# Patient Record
Sex: FEMALE | Race: WHITE | Employment: FULL TIME | URBAN - METROPOLITAN AREA
[De-identification: names, ages, dates, MRNs, and addresses within clinical notes are randomized per-mention and may not be internally consistent; named-entity substitution may affect disease eponyms.]

---

## 2017-02-13 PROBLEM — Z37.9 NORMAL LABOR: Status: ACTIVE | Noted: 2017-02-13

## 2019-05-28 NOTE — PROGRESS NOTES
Dulcie Cowden    Age 34 y.o.    female    1985    MRN [id number]    Date___________   Arrival Time_____________  OR Time____________Duration____     Procedure(s):  SUCTION DILATATION AND CURETTAGE    Surgeon  ________________________________  Tene Charlton   General   [place]       Phone [phone] (home)       [address]  Cell Work  ______________________________________________________________________________________________________________________________________________________________________________________________________________________________________________________________________________________________________________________________________________________________    PCP__________________________Phone__________________________________      H&P__________________Bringing      Chart              Epic    DOS           Called_______  EKG__________________Bringing      Chart              Epic    DOS           Called_______  LAB__________________ Bringing      Chart              Epic    DOS           Called_______  CardiacClearance _______Bringing      Chart              Epic    DOS           Called_______      Cardiologist________________________ Phone___________________________      ? Roman [employer] concerns / Waiver on Chart            PAT Communications________________  ? Pre-op Instructions Given [place]          _________________________________  ? Directions to Surgery Center                          _________________________________  ? Transportation Home_______________      _________________________________  ?  Crutches/Walker__________________        _________________________________      ________Pre-op Orders   _______Transcribed    _______Wt.  ________Pharmacy          _______SCD  ______VTE     ______Beta Blocker  ________Consent

## 2019-05-29 ENCOUNTER — ANESTHESIA EVENT (OUTPATIENT)
Dept: OPERATING ROOM | Age: 34
End: 2019-05-29
Payer: COMMERCIAL

## 2019-05-30 ENCOUNTER — ANESTHESIA (OUTPATIENT)
Dept: OPERATING ROOM | Age: 34
End: 2019-05-30
Payer: COMMERCIAL

## 2019-05-30 ENCOUNTER — HOSPITAL ENCOUNTER (OUTPATIENT)
Age: 34
Setting detail: OUTPATIENT SURGERY
Discharge: HOME | End: 2019-05-30
Attending: OBSTETRICS & GYNECOLOGY | Admitting: OBSTETRICS & GYNECOLOGY
Payer: COMMERCIAL

## 2019-05-30 VITALS
HEIGHT: 67 IN | RESPIRATION RATE: 18 BRPM | DIASTOLIC BLOOD PRESSURE: 60 MMHG | TEMPERATURE: 97.5 F | BODY MASS INDEX: 22.6 KG/M2 | HEART RATE: 72 BPM | WEIGHT: 144 LBS | SYSTOLIC BLOOD PRESSURE: 106 MMHG | OXYGEN SATURATION: 100 %

## 2019-05-30 VITALS
SYSTOLIC BLOOD PRESSURE: 94 MMHG | DIASTOLIC BLOOD PRESSURE: 50 MMHG | RESPIRATION RATE: 16 BRPM | OXYGEN SATURATION: 99 %

## 2019-05-30 DIAGNOSIS — O02.1 MISSED ABORTION: ICD-10-CM

## 2019-05-30 LAB
ABO/RH: NORMAL
ANTIBODY SCREEN: NORMAL
HCT VFR BLD CALC: 39.2 % (ref 36–48)
HEMOGLOBIN: 13.5 G/DL (ref 12–16)
MCH RBC QN AUTO: 30.3 PG (ref 26–34)
MCHC RBC AUTO-ENTMCNC: 34.4 G/DL (ref 31–36)
MCV RBC AUTO: 88 FL (ref 80–100)
PDW BLD-RTO: 12.6 % (ref 12.4–15.4)
PLATELET # BLD: 190 K/UL (ref 135–450)
PMV BLD AUTO: 8.6 FL (ref 5–10.5)
RBC # BLD: 4.46 M/UL (ref 4–5.2)
WBC # BLD: 3.4 K/UL (ref 4–11)

## 2019-05-30 PROCEDURE — 7100000001 HC PACU RECOVERY - ADDTL 15 MIN: Performed by: OBSTETRICS & GYNECOLOGY

## 2019-05-30 PROCEDURE — 6360000002 HC RX W HCPCS: Performed by: NURSE ANESTHETIST, CERTIFIED REGISTERED

## 2019-05-30 PROCEDURE — 7100000000 HC PACU RECOVERY - FIRST 15 MIN: Performed by: OBSTETRICS & GYNECOLOGY

## 2019-05-30 PROCEDURE — 2580000003 HC RX 258: Performed by: ANESTHESIOLOGY

## 2019-05-30 PROCEDURE — 86901 BLOOD TYPING SEROLOGIC RH(D): CPT

## 2019-05-30 PROCEDURE — 3600000004 HC SURGERY LEVEL 4 BASE: Performed by: OBSTETRICS & GYNECOLOGY

## 2019-05-30 PROCEDURE — 7100000011 HC PHASE II RECOVERY - ADDTL 15 MIN: Performed by: OBSTETRICS & GYNECOLOGY

## 2019-05-30 PROCEDURE — 2709999900 HC NON-CHARGEABLE SUPPLY: Performed by: OBSTETRICS & GYNECOLOGY

## 2019-05-30 PROCEDURE — 86900 BLOOD TYPING SEROLOGIC ABO: CPT

## 2019-05-30 PROCEDURE — 88305 TISSUE EXAM BY PATHOLOGIST: CPT

## 2019-05-30 PROCEDURE — 3700000000 HC ANESTHESIA ATTENDED CARE: Performed by: OBSTETRICS & GYNECOLOGY

## 2019-05-30 PROCEDURE — 3700000001 HC ADD 15 MINUTES (ANESTHESIA): Performed by: OBSTETRICS & GYNECOLOGY

## 2019-05-30 PROCEDURE — 3600000014 HC SURGERY LEVEL 4 ADDTL 15MIN: Performed by: OBSTETRICS & GYNECOLOGY

## 2019-05-30 PROCEDURE — 2580000003 HC RX 258: Performed by: OBSTETRICS & GYNECOLOGY

## 2019-05-30 PROCEDURE — 7100000010 HC PHASE II RECOVERY - FIRST 15 MIN: Performed by: OBSTETRICS & GYNECOLOGY

## 2019-05-30 PROCEDURE — 86850 RBC ANTIBODY SCREEN: CPT

## 2019-05-30 PROCEDURE — 85027 COMPLETE CBC AUTOMATED: CPT

## 2019-05-30 RX ORDER — MAGNESIUM HYDROXIDE 1200 MG/15ML
LIQUID ORAL CONTINUOUS PRN
Status: COMPLETED | OUTPATIENT
Start: 2019-05-30 | End: 2019-05-30

## 2019-05-30 RX ORDER — FENTANYL CITRATE 50 UG/ML
INJECTION, SOLUTION INTRAMUSCULAR; INTRAVENOUS PRN
Status: DISCONTINUED | OUTPATIENT
Start: 2019-05-30 | End: 2019-05-30 | Stop reason: SDUPTHER

## 2019-05-30 RX ORDER — OXYCODONE HYDROCHLORIDE AND ACETAMINOPHEN 5; 325 MG/1; MG/1
2 TABLET ORAL PRN
Status: DISCONTINUED | OUTPATIENT
Start: 2019-05-30 | End: 2019-05-30 | Stop reason: HOSPADM

## 2019-05-30 RX ORDER — MORPHINE SULFATE 2 MG/ML
2 INJECTION, SOLUTION INTRAMUSCULAR; INTRAVENOUS EVERY 5 MIN PRN
Status: DISCONTINUED | OUTPATIENT
Start: 2019-05-30 | End: 2019-05-30 | Stop reason: HOSPADM

## 2019-05-30 RX ORDER — PROMETHAZINE HYDROCHLORIDE 25 MG/ML
6.25 INJECTION, SOLUTION INTRAMUSCULAR; INTRAVENOUS
Status: DISCONTINUED | OUTPATIENT
Start: 2019-05-30 | End: 2019-05-30 | Stop reason: HOSPADM

## 2019-05-30 RX ORDER — ONDANSETRON 2 MG/ML
4 INJECTION INTRAMUSCULAR; INTRAVENOUS PRN
Status: DISCONTINUED | OUTPATIENT
Start: 2019-05-30 | End: 2019-05-30 | Stop reason: HOSPADM

## 2019-05-30 RX ORDER — DIPHENHYDRAMINE HYDROCHLORIDE 50 MG/ML
12.5 INJECTION INTRAMUSCULAR; INTRAVENOUS
Status: DISCONTINUED | OUTPATIENT
Start: 2019-05-30 | End: 2019-05-30 | Stop reason: HOSPADM

## 2019-05-30 RX ORDER — HYDRALAZINE HYDROCHLORIDE 20 MG/ML
5 INJECTION INTRAMUSCULAR; INTRAVENOUS EVERY 10 MIN PRN
Status: DISCONTINUED | OUTPATIENT
Start: 2019-05-30 | End: 2019-05-30 | Stop reason: HOSPADM

## 2019-05-30 RX ORDER — MORPHINE SULFATE 2 MG/ML
1 INJECTION, SOLUTION INTRAMUSCULAR; INTRAVENOUS EVERY 5 MIN PRN
Status: DISCONTINUED | OUTPATIENT
Start: 2019-05-30 | End: 2019-05-30 | Stop reason: HOSPADM

## 2019-05-30 RX ORDER — OXYCODONE HYDROCHLORIDE AND ACETAMINOPHEN 5; 325 MG/1; MG/1
1 TABLET ORAL PRN
Status: DISCONTINUED | OUTPATIENT
Start: 2019-05-30 | End: 2019-05-30 | Stop reason: HOSPADM

## 2019-05-30 RX ORDER — DEXAMETHASONE SODIUM PHOSPHATE 10 MG/ML
INJECTION INTRAMUSCULAR; INTRAVENOUS PRN
Status: DISCONTINUED | OUTPATIENT
Start: 2019-05-30 | End: 2019-05-30 | Stop reason: SDUPTHER

## 2019-05-30 RX ORDER — LABETALOL HYDROCHLORIDE 5 MG/ML
5 INJECTION, SOLUTION INTRAVENOUS EVERY 10 MIN PRN
Status: DISCONTINUED | OUTPATIENT
Start: 2019-05-30 | End: 2019-05-30 | Stop reason: HOSPADM

## 2019-05-30 RX ORDER — ONDANSETRON 2 MG/ML
INJECTION INTRAMUSCULAR; INTRAVENOUS PRN
Status: DISCONTINUED | OUTPATIENT
Start: 2019-05-30 | End: 2019-05-30 | Stop reason: SDUPTHER

## 2019-05-30 RX ORDER — MIDAZOLAM HYDROCHLORIDE 1 MG/ML
INJECTION INTRAMUSCULAR; INTRAVENOUS PRN
Status: DISCONTINUED | OUTPATIENT
Start: 2019-05-30 | End: 2019-05-30 | Stop reason: SDUPTHER

## 2019-05-30 RX ORDER — SODIUM CHLORIDE 0.9 % (FLUSH) 0.9 %
10 SYRINGE (ML) INJECTION EVERY 12 HOURS SCHEDULED
Status: DISCONTINUED | OUTPATIENT
Start: 2019-05-30 | End: 2019-05-30 | Stop reason: HOSPADM

## 2019-05-30 RX ORDER — PROPOFOL 10 MG/ML
INJECTION, EMULSION INTRAVENOUS PRN
Status: DISCONTINUED | OUTPATIENT
Start: 2019-05-30 | End: 2019-05-30 | Stop reason: SDUPTHER

## 2019-05-30 RX ORDER — LIDOCAINE HYDROCHLORIDE 10 MG/ML
1 INJECTION, SOLUTION EPIDURAL; INFILTRATION; INTRACAUDAL; PERINEURAL
Status: DISCONTINUED | OUTPATIENT
Start: 2019-05-30 | End: 2019-05-30 | Stop reason: HOSPADM

## 2019-05-30 RX ORDER — SODIUM CHLORIDE, SODIUM LACTATE, POTASSIUM CHLORIDE, CALCIUM CHLORIDE 600; 310; 30; 20 MG/100ML; MG/100ML; MG/100ML; MG/100ML
INJECTION, SOLUTION INTRAVENOUS CONTINUOUS
Status: DISCONTINUED | OUTPATIENT
Start: 2019-05-30 | End: 2019-05-30 | Stop reason: HOSPADM

## 2019-05-30 RX ORDER — MEPERIDINE HYDROCHLORIDE 50 MG/ML
12.5 INJECTION INTRAMUSCULAR; INTRAVENOUS; SUBCUTANEOUS EVERY 5 MIN PRN
Status: DISCONTINUED | OUTPATIENT
Start: 2019-05-30 | End: 2019-05-30 | Stop reason: HOSPADM

## 2019-05-30 RX ORDER — SODIUM CHLORIDE 0.9 % (FLUSH) 0.9 %
10 SYRINGE (ML) INJECTION PRN
Status: DISCONTINUED | OUTPATIENT
Start: 2019-05-30 | End: 2019-05-30 | Stop reason: HOSPADM

## 2019-05-30 RX ADMIN — MIDAZOLAM HYDROCHLORIDE 2 MG: 2 INJECTION, SOLUTION INTRAMUSCULAR; INTRAVENOUS at 10:15

## 2019-05-30 RX ADMIN — FENTANYL CITRATE 50 MCG: 50 INJECTION INTRAMUSCULAR; INTRAVENOUS at 10:15

## 2019-05-30 RX ADMIN — SODIUM CHLORIDE, POTASSIUM CHLORIDE, SODIUM LACTATE AND CALCIUM CHLORIDE: 600; 310; 30; 20 INJECTION, SOLUTION INTRAVENOUS at 08:41

## 2019-05-30 RX ADMIN — SODIUM CHLORIDE, POTASSIUM CHLORIDE, SODIUM LACTATE AND CALCIUM CHLORIDE: 600; 310; 30; 20 INJECTION, SOLUTION INTRAVENOUS at 10:12

## 2019-05-30 RX ADMIN — ONDANSETRON 4 MG: 2 INJECTION INTRAMUSCULAR; INTRAVENOUS at 10:27

## 2019-05-30 RX ADMIN — DEXAMETHASONE SODIUM PHOSPHATE 5 MG: 10 INJECTION INTRAMUSCULAR; INTRAVENOUS at 10:27

## 2019-05-30 RX ADMIN — PROPOFOL 200 MG: 10 INJECTION, EMULSION INTRAVENOUS at 10:15

## 2019-05-30 ASSESSMENT — PULMONARY FUNCTION TESTS
PIF_VALUE: 1
PIF_VALUE: 11
PIF_VALUE: 0
PIF_VALUE: 1
PIF_VALUE: 2
PIF_VALUE: 9
PIF_VALUE: 2
PIF_VALUE: 17
PIF_VALUE: 2
PIF_VALUE: 2
PIF_VALUE: 12
PIF_VALUE: 2
PIF_VALUE: 17
PIF_VALUE: 1
PIF_VALUE: 2
PIF_VALUE: 0
PIF_VALUE: 2
PIF_VALUE: 1
PIF_VALUE: 9
PIF_VALUE: 2
PIF_VALUE: 10
PIF_VALUE: 21
PIF_VALUE: 9
PIF_VALUE: 1
PIF_VALUE: 2

## 2019-05-30 ASSESSMENT — PAIN SCALES - GENERAL
PAINLEVEL_OUTOF10: 0

## 2019-05-30 ASSESSMENT — PAIN - FUNCTIONAL ASSESSMENT: PAIN_FUNCTIONAL_ASSESSMENT: 0-10

## 2019-05-30 NOTE — ANESTHESIA POSTPROCEDURE EVALUATION
Department of Anesthesiology  Postprocedure Note    Patient: Daisy Bolaños  MRN: [id number]  YOB: 1985  Date of evaluation: 2019  Time:  12:13 PM     Procedure Summary     Date:  19 Room / Location:  [facility] OR  [facility] OR    Anesthesia Start:  8681 Anesthesia Stop:  4882    Procedure:  SUCTION DILATATION AND CURETTAGE (N/A Vagina ) Diagnosis:       Missed       (Missed  [O02.1])    Surgeon:  Maribeth Mcgregor MD Responsible Provider:  Torito Ibarra MD    Anesthesia Type:  general ASA Status:  1          Anesthesia Type: general    Ranjana Phase I: Ranjana Score: 10    Ranjana Phase II: Ranjana Score: 10    Last vitals: Reviewed and per EMR flowsheets.        Anesthesia Post Evaluation    Patient location during evaluation: PACU  Patient participation: complete - patient participated  Level of consciousness: awake and alert  Pain score: 0  Airway patency: patent  Nausea & Vomiting: no nausea and no vomiting  Complications: no  Cardiovascular status: blood pressure returned to baseline  Respiratory status: acceptable  Hydration status: stable

## 2019-05-30 NOTE — BRIEF OP NOTE
Brief Postoperative Note  ______________________________________________________________    Patient: Madina Mclain  YOB: 1985  MRN: [id number]  Date of Procedure: 2019    Pre-Op Diagnosis: Missed  [O02.1]    Post-Op Diagnosis: Same       Procedure(s):  SUCTION DILATATION AND CURETTAGE    Anesthesia: General    Surgeon(s):  Melanie Faye MD      Estimated Blood Loss (mL): less than 50     Complications: None    Specimens:   ID Type Source Tests Collected by Time Destination   A : products of conception and endometrial curettings  Tissue Tissue SURGICAL PATHOLOGY Melanie Faye MD 2019 1029        Implants:  * No implants in log *      Drains: * No LDAs found *    Findings: Uterus sounded to 9 cm    Melanie Faye MD  Date: 2019  Time: 10:34 AM

## 2019-05-30 NOTE — ANESTHESIA PRE PROCEDURE
Department of Anesthesiology  Preprocedure Note       Name:  Titus Mejias   Age:  34 y.o.  :  1985                                          MRN:  [id number]         Date:  2019      Surgeon: Johann Meléndez):  Courtney Covington MD    Procedure: SUCTION DILATATION AND CURETTAGE (N/A Vagina )    Medications prior to admission:   Prior to Admission medications    Medication Sig Start Date End Date Taking? Authorizing Provider   Prenatal MV-Min-Fe Fum-FA-DHA (PRENATAL 1 PO) Take by mouth    Historical Provider, MD       Current medications:    Current Facility-Administered Medications   Medication Dose Route Frequency Provider Last Rate Last Dose    lactated ringers infusion   Intravenous Continuous Sherrie Raymond MD        sodium chloride flush 0.9 % injection 10 mL  10 mL Intravenous 2 times per day Sherrie Raymond MD        sodium chloride flush 0.9 % injection 10 mL  10 mL Intravenous PRN Sherrie Raymond MD        lidocaine PF 1 % injection 1 mL  1 mL Intradermal Once PRN Sherrie Raymond MD           Allergies: Allergies   Allergen Reactions    Amoxicillin Hives and Rash       Problem List:    Patient Active Problem List   Diagnosis Code    Dizziness R42    Normal labor O80, Z37.9       Past Medical History:        Diagnosis Date    Cancer ([facility] [address]     synovial cell sarcoma of right foot. 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 Social History:    Social History     Tobacco Use    Smoking status: Never Smoker    Smokeless tobacco: Never Used   Substance Use Topics    Alcohol use:  No                                Counseling given: Not Answered      Vital Signs (Current):   Vitals:    05/28/19 1457 05/30/19 0820   BP:  108/70   Pulse:  71   Resp:  16   Temp:  97.7 °F (36.5 °C)   TempSrc:  Oral   SpO2:  100%   Weight: 144 lb (65.3 kg)    Height: 5' 7\" (1.702 m)                                               BP Readings from Last 3 Encounters:   05/30/19 108/70   02/15/17 115/65   03/16/15 100/78       NPO Status: Time of last liquid consumption: 2100                        Time of last solid consumption: 2200                        Date of last liquid consumption: 05/29/19                        Date of last solid food consumption: 05/29/19    BMI:   Wt Readings from Last 3 Encounters:   05/28/19 144 lb (65.3 kg)   02/13/17 170 lb (77.1 kg)   03/16/15 138 lb 9.6 oz (62.9 kg)     Body mass index is 22.55 kg/m². CBC:   Lab Results   Component Value Date    WBC 17.8 02/14/2017    RBC 4.16 02/14/2017    HGB 12.0 02/14/2017    HCT 36.6 02/14/2017    MCV 88.1 02/14/2017    RDW 13.3 02/14/2017     02/14/2017       CMP:   Lab Results   Component Value Date     03/16/2015    K 4.5 03/16/2015     03/16/2015    CO2 28 03/16/2015    BUN 12 03/16/2015    CREATININE 0.6 03/16/2015    GFRAA >60 03/16/2015    LABGLOM >60 03/16/2015    GLUCOSE 79 03/16/2015    PROT 7.0 03/16/2015    CALCIUM 9.5 03/16/2015    BILITOT 0.4 03/16/2015    ALKPHOS 65 03/16/2015    AST 40 03/16/2015    ALT 30 03/16/2015       POC Tests: No results for input(s): POCGLU, POCNA, POCK, POCCL, POCBUN, POCHEMO, POCHCT in the last 72 hours.     Coags: No results found for: PROTIME, INR, APTT    HCG (If Applicable):   Lab Results   Component Value Date    PREGTESTUR neg 03/16/2015        ABGs: No results found for: PHART, PO2ART, NDQ1RPO, NAZ0WVU, BEART, V2JJVYQA     Type & Screen (If Applicable):  No results found for: LABABO, [address]    Anesthesia Evaluation  Patient summary reviewed and Nursing notes reviewed  Airway: Mallampati: I  TM distance: >3 FB   Neck ROM: full  Mouth opening: > = 3 FB Dental: normal exam         Pulmonary:Negative Pulmonary ROS and normal exam  breath sounds clear to auscultation                             Cardiovascular:Negative CV ROS            Rhythm: regular  Rate: normal                    Neuro/Psych:   Negative Neuro/Psych ROS              GI/Hepatic/Renal: Neg GI/Hepatic/Renal ROS            Endo/Other: Negative Endo/Other ROS                    Abdominal:           Vascular: negative vascular ROS. Anesthesia Plan      general     ASA 1       Induction: intravenous. MIPS: Postoperative opioids intended and Prophylactic antiemetics administered. Anesthetic plan and risks discussed with patient. Plan discussed with CRNA.                   Harrison Larson MD   5/30/2019

## 2019-05-30 NOTE — OP NOTE
[address] [address]                                OPERATIVE REPORT    PATIENT NAME: Qing Michelle                :        1985  MED REC NO:   [id number]                          ROOM:  ACCOUNT NO:   [de-identified]                           ADMIT DATE: 2019  PROVIDER:     Kimberly Mckeon MD    DATE OF PROCEDURE:  2019    PREOPERATIVE DIAGNOSIS:  Missed AB. POSTOPERATIVE DIAGNOSIS:  Missed AB. OPERATION PERFORMED:  Suction dilation and curettage. ANESTHESIA:  General.    SURGEON:  EMELY New MD    ESTIMATED BLOOD LOSS:  Less than 50 mL. COMPLICATIONS:  None. SPECIMENS:  Products of conception. FINDINGS:  Uterus sounded to 9 cm. HISTORY OF PRESENT ILLNESS:  The patient is a 37-year-old female that  had an ultrasound that showed an irregular gestational sac; however,  fetal cardiac activity was noted. A repeat ultrasound was performed and  no fetal cardiac activity was noted. The risks and benefits and  alternatives of proceeding with suction dilation and curettage were  reviewed with the patient and partner. She desired to proceed with  suction D and C. She presented to the OR. She had IV fluids running. OPERATIVE PROCEDURE:  She was taken to the operating room and general  anesthesia was performed. Her legs were placed in candy cane stirrups. She was prepped and draped in the normal sterile fashion. A red-rubber  catheter was used to drain her bladder. A time-out was performed and  then weighted speculum was placed in the posterior aspect of the vagina. The Au retractor was placed anteriorly. The single-tooth tenaculum  was placed on the anterior aspect of the cervix. The weighted speculum  was removed posteriorly and Au retractor was placed there. The  anterior Au retractor had been removed. The cervix was already  dilated.   It was continued to be dilated to accept the  uterine sound. The uterus sounded to 9 cm. The cervix was continued to  be dilated until to receive #8 curved suction curette. It was gently  introduced into the endometrial cavity. The products of conceptions  were removed. Sharp gentle curettage was performed until a gritty  texture was noted. The suction curette was reintroduced to remove the  remaining products of conception. Bleeding was minimal.  The  single-tooth tenaculum was removed from the anterior aspect of the  cervix. The tenac site was noted to be hemostatic. The Au retractor  was removed posteriorly. The patient was taken to Recovery in stable  condition.         Kt Caal MD    D: 05/30/2019 10:40:17       T: 05/30/2019 15:42:37     [email or web address]  Job#: [id number]     Doc#: [id number]    CC:

## 2020-01-07 LAB
ABO, EXTERNAL RESULT: NORMAL
HEP B, EXTERNAL RESULT: NEGATIVE
HIV, EXTERNAL RESULT: NORMAL
RH FACTOR, EXTERNAL RESULT: POSITIVE
RPR, EXTERNAL RESULT: NONREACTIVE
RUBELLA TITER, EXTERNAL RESULT: NORMAL

## 2020-06-30 LAB — GBS, EXTERNAL RESULT: NEGATIVE

## 2020-07-13 ENCOUNTER — OFFICE VISIT (OUTPATIENT)
Dept: PRIMARY CARE CLINIC | Age: 35
End: 2020-07-13
Payer: COMMERCIAL

## 2020-07-13 PROCEDURE — 99211 OFF/OP EST MAY X REQ PHY/QHP: CPT | Performed by: NURSE PRACTITIONER

## 2020-07-13 NOTE — PROGRESS NOTES
Anna Izzy received a viral test for COVID-19. They were educated on isolation and quarantine as appropriate. For any symptoms, they were directed to seek care from their PCP, given contact information to establish with a doctor, directed to an urgent care or the emergency room.

## 2020-07-15 ENCOUNTER — HOSPITAL ENCOUNTER (INPATIENT)
Age: 35
LOS: 2 days | Discharge: HOME | End: 2020-07-17
Attending: OBSTETRICS & GYNECOLOGY | Admitting: OBSTETRICS & GYNECOLOGY
Payer: COMMERCIAL

## 2020-07-15 ENCOUNTER — ANESTHESIA (OUTPATIENT)
Dept: LABOR AND DELIVERY | Age: 35
End: 2020-07-15
Payer: COMMERCIAL

## 2020-07-15 ENCOUNTER — ANESTHESIA EVENT (OUTPATIENT)
Dept: LABOR AND DELIVERY | Age: 35
End: 2020-07-15
Payer: COMMERCIAL

## 2020-07-15 PROBLEM — Z3A.38 38 WEEKS GESTATION OF PREGNANCY: Status: ACTIVE | Noted: 2020-07-15

## 2020-07-15 PROBLEM — Z3A.39 39 WEEKS GESTATION OF PREGNANCY: Status: ACTIVE | Noted: 2020-07-15

## 2020-07-15 LAB
BASOPHILS ABSOLUTE: 0 K/UL (ref 0–0.2)
BASOPHILS RELATIVE PERCENT: 0.4 %
EOSINOPHILS ABSOLUTE: 0 K/UL (ref 0–0.6)
EOSINOPHILS RELATIVE PERCENT: 0 %
HCT VFR BLD CALC: 37.3 % (ref 36–48)
HEMOGLOBIN: 12.6 G/DL (ref 12–16)
LYMPHOCYTES ABSOLUTE: 1.6 K/UL (ref 1–5.1)
LYMPHOCYTES RELATIVE PERCENT: 17.1 %
MCH RBC QN AUTO: 30.5 PG (ref 26–34)
MCHC RBC AUTO-ENTMCNC: 33.8 G/DL (ref 31–36)
MCV RBC AUTO: 90.1 FL (ref 80–100)
MONOCYTES ABSOLUTE: 0.6 K/UL (ref 0–1.3)
MONOCYTES RELATIVE PERCENT: 6 %
NEUTROPHILS ABSOLUTE: 7.3 K/UL (ref 1.7–7.7)
NEUTROPHILS RELATIVE PERCENT: 76.5 %
PDW BLD-RTO: 12.8 % (ref 12.4–15.4)
PLATELET # BLD: 216 K/UL (ref 135–450)
PMV BLD AUTO: 8.7 FL (ref 5–10.5)
RBC # BLD: 4.14 M/UL (ref 4–5.2)
SARS-COV-2, NAAT: NOT DETECTED
SPECIMEN STATUS: NORMAL
WBC # BLD: 9.5 K/UL (ref 4–11)

## 2020-07-15 PROCEDURE — 86780 TREPONEMA PALLIDUM: CPT

## 2020-07-15 PROCEDURE — 85025 COMPLETE CBC W/AUTO DIFF WBC: CPT

## 2020-07-15 PROCEDURE — 7200000001 HC VAGINAL DELIVERY

## 2020-07-15 PROCEDURE — 87591 N.GONORRHOEAE DNA AMP PROB: CPT

## 2020-07-15 PROCEDURE — 10907ZC DRAINAGE OF AMNIOTIC FLUID, THERAPEUTIC FROM PRODUCTS OF CONCEPTION, VIA NATURAL OR ARTIFICIAL OPENING: ICD-10-PCS | Performed by: OBSTETRICS & GYNECOLOGY

## 2020-07-15 PROCEDURE — 2500000003 HC RX 250 WO HCPCS: Performed by: NURSE ANESTHETIST, CERTIFIED REGISTERED

## 2020-07-15 PROCEDURE — 2580000003 HC RX 258: Performed by: OBSTETRICS & GYNECOLOGY

## 2020-07-15 PROCEDURE — 0HQ9XZZ REPAIR PERINEUM SKIN, EXTERNAL APPROACH: ICD-10-PCS | Performed by: OBSTETRICS & GYNECOLOGY

## 2020-07-15 PROCEDURE — 87491 CHLMYD TRACH DNA AMP PROBE: CPT

## 2020-07-15 PROCEDURE — 51701 INSERT BLADDER CATHETER: CPT

## 2020-07-15 PROCEDURE — 3700000025 EPIDURAL BLOCK: Performed by: ANESTHESIOLOGY

## 2020-07-15 PROCEDURE — 1220000000 HC SEMI PRIVATE OB R&B

## 2020-07-15 PROCEDURE — 80307 DRUG TEST PRSMV CHEM ANLYZR: CPT

## 2020-07-15 RX ORDER — SODIUM CHLORIDE, SODIUM LACTATE, POTASSIUM CHLORIDE, CALCIUM CHLORIDE 600; 310; 30; 20 MG/100ML; MG/100ML; MG/100ML; MG/100ML
INJECTION, SOLUTION INTRAVENOUS CONTINUOUS
Status: DISCONTINUED | OUTPATIENT
Start: 2020-07-15 | End: 2020-07-15

## 2020-07-15 RX ORDER — DOCUSATE SODIUM 100 MG/1
100 CAPSULE, LIQUID FILLED ORAL 2 TIMES DAILY
Status: DISCONTINUED | OUTPATIENT
Start: 2020-07-15 | End: 2020-07-17 | Stop reason: HOSPADM

## 2020-07-15 RX ORDER — SODIUM CHLORIDE 0.9 % (FLUSH) 0.9 %
10 SYRINGE (ML) INJECTION EVERY 12 HOURS SCHEDULED
Status: DISCONTINUED | OUTPATIENT
Start: 2020-07-15 | End: 2020-07-17 | Stop reason: HOSPADM

## 2020-07-15 RX ORDER — BUTORPHANOL TARTRATE 1 MG/ML
1 INJECTION, SOLUTION INTRAMUSCULAR; INTRAVENOUS
Status: DISCONTINUED | OUTPATIENT
Start: 2020-07-15 | End: 2020-07-15

## 2020-07-15 RX ORDER — KETOROLAC TROMETHAMINE 30 MG/ML
30 INJECTION, SOLUTION INTRAMUSCULAR; INTRAVENOUS EVERY 6 HOURS
Status: ACTIVE | OUTPATIENT
Start: 2020-07-15 | End: 2020-07-16

## 2020-07-15 RX ORDER — ACETAMINOPHEN 325 MG/1
650 TABLET ORAL EVERY 4 HOURS PRN
Status: DISCONTINUED | OUTPATIENT
Start: 2020-07-15 | End: 2020-07-17 | Stop reason: HOSPADM

## 2020-07-15 RX ORDER — SODIUM CHLORIDE 0.9 % (FLUSH) 0.9 %
10 SYRINGE (ML) INJECTION PRN
Status: DISCONTINUED | OUTPATIENT
Start: 2020-07-15 | End: 2020-07-17 | Stop reason: HOSPADM

## 2020-07-15 RX ORDER — SODIUM CHLORIDE 0.9 % (FLUSH) 0.9 %
10 SYRINGE (ML) INJECTION EVERY 12 HOURS SCHEDULED
Status: DISCONTINUED | OUTPATIENT
Start: 2020-07-15 | End: 2020-07-15

## 2020-07-15 RX ORDER — SODIUM CHLORIDE 0.9 % (FLUSH) 0.9 %
10 SYRINGE (ML) INJECTION PRN
Status: DISCONTINUED | OUTPATIENT
Start: 2020-07-15 | End: 2020-07-15

## 2020-07-15 RX ORDER — LANOLIN 100 %
OINTMENT (GRAM) TOPICAL PRN
Status: DISCONTINUED | OUTPATIENT
Start: 2020-07-15 | End: 2020-07-17 | Stop reason: HOSPADM

## 2020-07-15 RX ORDER — BUPIVACAINE HYDROCHLORIDE 5 MG/ML
INJECTION, SOLUTION EPIDURAL; INTRACAUDAL PRN
Status: DISCONTINUED | OUTPATIENT
Start: 2020-07-15 | End: 2020-07-15 | Stop reason: SDUPTHER

## 2020-07-15 RX ORDER — BUPIVACAINE HYDROCHLORIDE 2.5 MG/ML
INJECTION, SOLUTION EPIDURAL; INFILTRATION; INTRACAUDAL
Status: DISCONTINUED
Start: 2020-07-15 | End: 2020-07-15

## 2020-07-15 RX ORDER — DIPHENHYDRAMINE HYDROCHLORIDE 50 MG/ML
12.5 INJECTION INTRAMUSCULAR; INTRAVENOUS EVERY 6 HOURS PRN
Status: DISCONTINUED | OUTPATIENT
Start: 2020-07-15 | End: 2020-07-17 | Stop reason: HOSPADM

## 2020-07-15 RX ORDER — METHYLERGONOVINE MALEATE 0.2 MG/ML
200 INJECTION INTRAVENOUS PRN
Status: DISCONTINUED | OUTPATIENT
Start: 2020-07-15 | End: 2020-07-17 | Stop reason: HOSPADM

## 2020-07-15 RX ORDER — DIPHENHYDRAMINE HCL 25 MG
25 TABLET ORAL EVERY 4 HOURS PRN
Status: DISCONTINUED | OUTPATIENT
Start: 2020-07-15 | End: 2020-07-15

## 2020-07-15 RX ORDER — ACETAMINOPHEN 325 MG/1
650 TABLET ORAL EVERY 4 HOURS PRN
Status: DISCONTINUED | OUTPATIENT
Start: 2020-07-15 | End: 2020-07-15

## 2020-07-15 RX ORDER — SODIUM CHLORIDE, SODIUM LACTATE, POTASSIUM CHLORIDE, CALCIUM CHLORIDE 600; 310; 30; 20 MG/100ML; MG/100ML; MG/100ML; MG/100ML
INJECTION, SOLUTION INTRAVENOUS CONTINUOUS
Status: DISCONTINUED | OUTPATIENT
Start: 2020-07-15 | End: 2020-07-17 | Stop reason: HOSPADM

## 2020-07-15 RX ORDER — IBUPROFEN 600 MG/1
600 TABLET ORAL EVERY 6 HOURS PRN
Status: DISCONTINUED | OUTPATIENT
Start: 2020-07-16 | End: 2020-07-17 | Stop reason: HOSPADM

## 2020-07-15 RX ORDER — ONDANSETRON 2 MG/ML
4 INJECTION INTRAMUSCULAR; INTRAVENOUS EVERY 6 HOURS PRN
Status: DISCONTINUED | OUTPATIENT
Start: 2020-07-15 | End: 2020-07-15

## 2020-07-15 RX ORDER — AMMONIA INHALANTS 0.04 G/.3ML
INHALANT RESPIRATORY (INHALATION)
Status: DISCONTINUED
Start: 2020-07-15 | End: 2020-07-15

## 2020-07-15 RX ADMIN — SODIUM CHLORIDE, POTASSIUM CHLORIDE, SODIUM LACTATE AND CALCIUM CHLORIDE: 600; 310; 30; 20 INJECTION, SOLUTION INTRAVENOUS at 19:19

## 2020-07-15 RX ADMIN — Medication 15 ML/HR: at 19:07

## 2020-07-15 RX ADMIN — SODIUM CHLORIDE, POTASSIUM CHLORIDE, SODIUM LACTATE AND CALCIUM CHLORIDE: 600; 310; 30; 20 INJECTION, SOLUTION INTRAVENOUS at 18:43

## 2020-07-15 RX ADMIN — BUPIVACAINE HYDROCHLORIDE 7 ML: 5 INJECTION, SOLUTION EPIDURAL; INTRACAUDAL; PERINEURAL at 19:35

## 2020-07-15 RX ADMIN — SODIUM CHLORIDE, POTASSIUM CHLORIDE, SODIUM LACTATE AND CALCIUM CHLORIDE: 600; 310; 30; 20 INJECTION, SOLUTION INTRAVENOUS at 18:00

## 2020-07-15 RX ADMIN — BUPIVACAINE HYDROCHLORIDE 0.8 ML: 5 INJECTION, SOLUTION EPIDURAL; INTRACAUDAL; PERINEURAL at 18:58

## 2020-07-15 NOTE — H&P
Department of Obstetrics and Gynecology   Obstetrics History and Physical        CHIEF COMPLAINT:  contractions    HISTORY OF PRESENT ILLNESS:      The patient is a 30 y.o. female at 36w4d. OB History        3    Para   1    Term   1       0    AB   1    Living   1       SAB   1    TAB   0    Ectopic   0    Molar        Multiple   0    Live Births   1            Patient presents with a chief complaint as above and is being admitted for active phase labor    Estimated Due Date: Estimated Date of Delivery: 20    PRENATAL CARE:    Complicated by: none    PAST OB HISTORY:  OB History        3    Para   1    Term   1       0    AB   1    Living   1       SAB   1    TAB   0    Ectopic   0    Molar        Multiple   0    Live Births   1                Past Medical History:        Diagnosis Date    Cancer ([facility] [address]     synovial cell sarcoma of right foot.  Infertility, female     This is clomid pregnancy, also used progesterone, pregnant on  round    Synovial sarcoma, epithelioid cell ([facility] [address]     foot     Past Surgical History:        Procedure Laterality Date    DILATION AND CURETTAGE OF UTERUS N/A 2019    SUCTION DILATATION AND CURETTAGE performed by Tiago Mcdonald MD at [address] [place] [address]      patient had synovial sarcoma, had foot surgery and lung biopsy, lungs negative, foot + for cancer, had radiation.     PRE-MALIGNANT / BENIGN SKIN LESION EXCISION      WISDOM TOOTH EXTRACTION       Allergies:  Amoxicillin    Social History:    Social History     Socioeconomic History    Marital status:      Spouse name: Not on file    Number of children: Not on file    Years of education: Not on file    Highest education level: Not on file   Occupational History    Not on file   Social Needs    Financial resource strain: Not on file    Food insecurity     Worry: Not on file     Inability: Not on file    Transportation needs     Medical: Not on file Non-medical: Not on file   Tobacco Use    Smoking status: Never Smoker    Smokeless tobacco: Never Used   Substance and Sexual Activity    Alcohol use: No    Drug use: No    Sexual activity: Yes     Partners: Male   Lifestyle    Physical activity     Days per week: Not on file     Minutes per session: Not on file    Stress: Not on file   Relationships    Social connections     Talks on phone: Not on file     Gets together: Not on file     Attends Shinto service: Not on file     Active member of club or organization: Not on file     Attends meetings of clubs or organizations: Not on file     Relationship status: Not on file    Intimate partner violence     Fear of current or ex partner: Not on file     Emotionally abused: Not on file     Physically abused: Not on file     Forced sexual activity: Not on file   Other Topics Concern    Not on file   Social History Narrative    Not on file     Family History:       Problem Relation Age of Onset    High Blood Pressure Mother     Cancer Paternal Aunt         ovarian    Heart Disease Paternal Aunt         MI    High Blood Pressure Maternal Grandmother     Heart Disease Maternal Grandmother         CHF    Stroke Maternal Grandmother         PE    High Blood Pressure Maternal Grandfather     Stroke Maternal Grandfather     Cancer Maternal Grandfather         prostate, kidney    Other Maternal Grandfather         abdominal aneurysm    Heart Disease Maternal Grandfather         chf    Kidney Disease Maternal Grandfather     Cancer Paternal Grandmother         ovarian    High Blood Pressure Paternal Grandmother     Heart Disease Paternal Grandmother         MI    Diabetes Paternal Grandmother     High Blood Pressure Maternal Aunt     High Blood Pressure Maternal Uncle      Medications Prior to Admission:  Medications Prior to Admission: Prenatal MV-Min-Fe Fum-FA-DHA (PRENATAL 1 PO), Take by mouth    REVIEW OF SYSTEMS:    wnl    PHYSICAL

## 2020-07-15 NOTE — ANESTHESIA PRE PROCEDURE
Department of Anesthesiology  Preprocedure Note       Name:  Montserrat Chong   Age:  30 y.o.  :  1985                                          MRN:  [id number]         Date:  7/15/2020      Surgeon: * No surgeons listed *    Procedure: * No procedures listed *    Medications prior to admission:   Prior to Admission medications    Medication Sig Start Date End Date Taking? Authorizing Provider   Prenatal MV-Min-Fe Fum-FA-DHA (PRENATAL 1 PO) Take by mouth    Historical Provider, MD       Current medications:    Current Facility-Administered Medications   Medication Dose Route Frequency Provider Last Rate Last Dose    lactated ringers infusion   Intravenous Continuous Boris Cooks,  mL/hr at 07/15/20 1843      sodium chloride flush 0.9 % injection 10 mL  10 mL Intravenous 2 times per day Boris Cooks, MD        sodium chloride flush 0.9 % injection 10 mL  10 mL Intravenous PRN Boris Cooks, MD        acetaminophen (TYLENOL) tablet 650 mg  650 mg Oral Q4H PRN Boris Cooks, MD        diphenhydrAMINE (BENADRYL) tablet 25 mg  25 mg Oral Q4H PRN Boris Cooks, MD        ondansetron [facility]) injection 4 mg  4 mg Intravenous Q6H PRN Boris Cooks, MD        oxytocin (PITOCIN) 30 units in 500 mL infusion  1 cristi-units/min Intravenous Continuous PRN Boris Cooks, MD        butorphanol (STADOL) injection 1 mg  1 mg Intravenous Q3H PRN Boris Cooks, MD        bupivacaine (PF) (MARCAINE) 0.25 % injection             sodium chloride 0.9 % 200 mL with fentaNYL 500 mcg, bupivacaine 0.5% 50 mL (OB) epidural                Allergies:     Allergies   Allergen Reactions    Amoxicillin Hives and Rash       Problem List:    Patient Active Problem List   Diagnosis Code    Dizziness R42    Normal labor O80, Z37.9    Missed ab O02.1    38 weeks gestation of pregnancy Z3A.38       Past Medical History:        Diagnosis Date    Cancer ([address]     synovial

## 2020-07-15 NOTE — PROGRESS NOTES
Call to EMELY Umaña CRNA to inform him patient was still having pain on her right side.  He stated he would be in to check on her

## 2020-07-15 NOTE — ANESTHESIA PROCEDURE NOTES
Epidural Block    Patient location during procedure: OB  Start time: 7/15/2020 6:52 PM  End time: 7/15/2020 7:05 PM  Reason for block: labor epidural  Staffing  Anesthesiologist: Ruba Loera MD  Resident/CRNA: KOLBY Darling - CRNA  Performed: resident/CRNA   Preanesthetic Checklist  Completed: patient identified, pre-op evaluation, timeout performed, IV checked, risks and benefits discussed, monitors and equipment checked, anesthesia consent given, oxygen available and patient being monitored  Epidural  Patient position: sitting  Prep: ChloraPrep and site prepped and draped  Patient monitoring: cardiac monitor, continuous pulse ox and frequent blood pressure checks  Approach: midline  Location: lumbar (1-5)  Injection technique: KARSTEN air  Provider prep: mask and sterile gloves  Needle  Needle type: Tuohy   Needle gauge: 17 G  Needle length: 3.5 in  Needle insertion depth: 4 cm  Catheter type: side hole  Catheter size: 19 G (20 G)  Catheter at skin depth: 10 cm  Test dose: negative (3ml 1.5% lido with epi)  Assessment  Sensory level: T8  Hemodynamics: stable  Attempts: 1  Additional Notes  25 gauge pencil point spinal needle inserted through tuohy. Positive CSF. 0.8ml 0.5% bupivicaine injected.

## 2020-07-15 NOTE — PROGRESS NOTES
Call from Dr. Paolo Quinteros. I informed him patient received epidural and I asked if he wanted me to go ahead and check her. He said he would be over to check her and break her water.

## 2020-07-16 LAB
AMPHETAMINE SCREEN, URINE: NORMAL
BARBITURATE SCREEN URINE: NORMAL
BENZODIAZEPINE SCREEN, URINE: NORMAL
BUPRENORPHINE URINE: NORMAL
CANNABINOID SCREEN URINE: NORMAL
COCAINE METABOLITE SCREEN URINE: NORMAL
Lab: NORMAL
METHADONE SCREEN, URINE: NORMAL
OPIATE SCREEN URINE: NORMAL
OXYCODONE URINE: NORMAL
PH UA: 6
PHENCYCLIDINE SCREEN URINE: NORMAL
PROPOXYPHENE SCREEN: NORMAL
SARS-COV-2: NOT DETECTED
SOURCE: NORMAL
TOTAL SYPHILLIS IGG/IGM: NORMAL

## 2020-07-16 PROCEDURE — 2580000003 HC RX 258: Performed by: OBSTETRICS & GYNECOLOGY

## 2020-07-16 PROCEDURE — 7200000001 HC VAGINAL DELIVERY

## 2020-07-16 PROCEDURE — 51701 INSERT BLADDER CATHETER: CPT

## 2020-07-16 PROCEDURE — 6370000000 HC RX 637 (ALT 250 FOR IP): Performed by: OBSTETRICS & GYNECOLOGY

## 2020-07-16 PROCEDURE — 1220000000 HC SEMI PRIVATE OB R&B

## 2020-07-16 RX ADMIN — DOCUSATE SODIUM 100 MG: 100 CAPSULE, LIQUID FILLED ORAL at 21:03

## 2020-07-16 RX ADMIN — Medication 10 ML: at 21:03

## 2020-07-16 RX ADMIN — DOCUSATE SODIUM 100 MG: 100 CAPSULE, LIQUID FILLED ORAL at 08:31

## 2020-07-16 RX ADMIN — IBUPROFEN 600 MG: 600 TABLET, FILM COATED ORAL at 07:42

## 2020-07-16 RX ADMIN — Medication 10 ML: at 08:50

## 2020-07-16 RX ADMIN — IBUPROFEN 600 MG: 600 TABLET, FILM COATED ORAL at 16:52

## 2020-07-16 ASSESSMENT — PAIN SCALES - GENERAL
PAINLEVEL_OUTOF10: 3
PAINLEVEL_OUTOF10: 3

## 2020-07-16 NOTE — PLAN OF CARE
Problem: Discharge Planning:  Goal: Discharged to appropriate level of care  Description: Discharged to appropriate level of care  7/16/2020 1335 by Gil Dillard RN  Outcome: Ongoing  7/16/2020 0736 by Kelsey Sanches RN  Outcome: Ongoing     Problem: Constipation:  Goal: Bowel elimination is within specified parameters  Description: Bowel elimination is within specified parameters  7/16/2020 1335 by Gil Dillard RN  Outcome: Ongoing  7/16/2020 0736 by Kelsey Sanches RN  Outcome: Ongoing     Problem: Fluid Volume - Imbalance:  Goal: Absence of imbalanced fluid volume signs and symptoms  Description: Absence of imbalanced fluid volume signs and symptoms  7/16/2020 1335 by Gil Dillard RN  Outcome: Ongoing  7/16/2020 0736 by Kelsey Sanches RN  Outcome: Ongoing  Goal: Absence of postpartum hemorrhage signs and symptoms  Description: Absence of postpartum hemorrhage signs and symptoms  7/16/2020 1335 by Gil Dillard RN  Outcome: Ongoing  7/16/2020 0736 by Kelsey Sanches RN  Outcome: Ongoing     Problem: Infection - Risk of, Puerperal Infection:  Goal: Will show no infection signs and symptoms  Description: Will show no infection signs and symptoms  7/16/2020 1335 by Gil Dillard RN  Outcome: Ongoing  7/16/2020 0736 by Kelsey Sanches RN  Outcome: Ongoing     Problem: Mood - Altered:  Goal: Mood stable  Description: Mood stable  7/16/2020 1335 by Gil Dillard RN  Outcome: Ongoing  7/16/2020 0736 by Kelsey Sanches RN  Outcome: Ongoing     Problem: Pain - Acute:  Goal: Pain level will decrease  Description: Pain level will decrease  7/16/2020 1335 by Gil Dillard RN  Outcome: Ongoing  7/16/2020 0736 by Kelsey Sanches RN  Outcome: Ongoing

## 2020-07-16 NOTE — PROGRESS NOTES
Department of Obstetrics and Gynecology  Labor and Delivery  Attending Post Partum Progress Note      SUBJECTIVE:  Pt without complaints, pain controlled, tolerating po, lochia wnl, currently breast feeding. OBJECTIVE:      Vitals:  Vitals:    20 0426   BP: 114/62   Pulse: 82   Resp: 16   Temp: 98.1 °F (36.7 °C)       RRR CTAB  ABDOMEN:  soft, non-distended, non-tender, + BS  FF below umbilicus  EXT: no edema    DATA:    CBC:    Lab Results   Component Value Date    WBC 9.5 07/15/2020    HGB 12.6 07/15/2020    HCT 37.3 07/15/2020     07/15/2020       ASSESSMENT & PLAN:    30 y.o.   OB History        3    Para   2    Term   2       0    AB   1    Living   2       SAB   1    TAB   0    Ectopic   0    Molar        Multiple   0    Live Births   2             s/p  ppd# 1  1. Doing well, continue routine post-partum care.

## 2020-07-16 NOTE — ANESTHESIA POSTPROCEDURE EVALUATION
Department of Anesthesiology  Postprocedure Note    Patient: Gina Rowan  MRN: [id number]  YOB: 1985  Date of evaluation: 7/16/2020  Time:  6:35 AM     Procedure Summary     Date:  07/15/20 Room / Location:      Anesthesia Start:  1852 Anesthesia Stop:  2136    Procedure:  Labor Analgesia Diagnosis:      Scheduled Providers:   Responsible Provider:  Aldo Lewis MD    Anesthesia Type:  epidural ASA Status:  2          Anesthesia Type: No value filed. Ranjana Phase I: Ranjana Score: 10    Ranjana Phase II: Ranjana Score: 10    Last vitals: Reviewed and per EMR flowsheets. Anesthesia Post Evaluation    Level of consciousness: awake  Complications: no  Cardiovascular status: hemodynamically stable  Respiratory status: acceptable  Comments: No apparent complications from neuraxial anesthesia.

## 2020-07-16 NOTE — PROGRESS NOTES
Pt unable to void at this time. Pt made aware that she needs to inform RN upon voiding. Pt states she did require catheterization a few times after her last delivery. RN stated that this may end up being necessary if she is unable to void within the next couple of hours.

## 2020-07-16 NOTE — BRIEF OP NOTE
Brief Postoperative Note      Patient: Sofia Starkey  YOB: 1985  MRN: [id number]    Date of Procedure:     , VFI  Apgars   1st degree perineal laceration  Placenta spon.   cc  \"Suki\"        Electronically signed by Anthony Rivera MD on 7/15/2020 at 9:54 PM

## 2020-07-16 NOTE — LACTATION NOTE
This note was copied from a baby's chart. Lactation Progress Note      Data:   RN requesting lactation RN for pt who just recently delivered. Infant currently crying STS with mom in cross cradle hold with mom attempting to latch infant on to breast. Mother's nipple appear to be flat. Mother  her first baby but had a lot of difficulty as infant had a tongue tie that was not caught for \"a few weeks. \"     Action: Introduced self to patient as Lactation RN, name and phone number written on white board in room. Assisted mother in pillow support as mother independently supported breast and infant in cross cradle position. After multiple attempts, infant able to latch on with ANTONIO and SRS. Reviewed with mother what to expect over the next  24-48 hours with infant feedings, infant output, and breast care. Reviewed infant feeding cues and encouraged mother to allow infant to breast feed on demand, a minimum of 8-12 times a day after the first day of life. Also encouraged mother to avoid giving infant a pacifier or bottle for at least the first two weeks of life. Binder and breast feeding log reviewed, all questions answered. Mother instructed to call Lactation nurse for F/U care as needed. Response: Mother verbalized an understanding of education and pleased with feeding.

## 2020-07-17 VITALS
TEMPERATURE: 98.2 F | BODY MASS INDEX: 27.62 KG/M2 | RESPIRATION RATE: 16 BRPM | DIASTOLIC BLOOD PRESSURE: 76 MMHG | SYSTOLIC BLOOD PRESSURE: 117 MMHG | WEIGHT: 176 LBS | HEART RATE: 85 BPM | HEIGHT: 67 IN

## 2020-07-17 LAB
C. TRACHOMATIS DNA ,URINE: NEGATIVE
N. GONORRHOEAE DNA, URINE: NEGATIVE

## 2020-07-17 PROCEDURE — 6370000000 HC RX 637 (ALT 250 FOR IP): Performed by: OBSTETRICS & GYNECOLOGY

## 2020-07-17 RX ORDER — IBUPROFEN 600 MG/1
600 TABLET ORAL EVERY 6 HOURS PRN
Qty: 30 TABLET | Refills: 0 | Status: SHIPPED | OUTPATIENT
Start: 2020-07-17 | End: 2021-03-10

## 2020-07-17 RX ADMIN — IBUPROFEN 600 MG: 600 TABLET, FILM COATED ORAL at 02:48

## 2020-07-17 RX ADMIN — IBUPROFEN 600 MG: 600 TABLET, FILM COATED ORAL at 09:15

## 2020-07-17 RX ADMIN — DOCUSATE SODIUM 100 MG: 100 CAPSULE, LIQUID FILLED ORAL at 09:15

## 2020-07-17 ASSESSMENT — PAIN SCALES - GENERAL
PAINLEVEL_OUTOF10: 2
PAINLEVEL_OUTOF10: 4

## 2020-07-17 NOTE — FLOWSHEET NOTE
Pt very attentive to infant no requests this shift. IV with some bruising pt states it has been that way since it was put in IV flushes without problem.

## 2020-07-17 NOTE — DISCHARGE SUMMARY
Obstetrical Discharge Form    Gestational Age:38w3d    Antepartum complications: none    Date of Delivery: 7/15/2020    Type of Delivery: vaginal, spontaneous    Delivered By:  Dr Estrella Fall:   Information for the patient's :   Tab Cobb [[id number]]        Anesthesia: Epidural    Intrapartum complications: None    Postpartum complications: none    Discharge Medication:    Gabriella Guerra   [place] Medication Instructions [facility]:[id number]    Printed on:20 [id number]   Medication Information                      ibuprofen (ADVIL;MOTRIN) 600 MG tablet  Take 1 tablet by mouth every 6 hours as needed for Pain             Prenatal MV-Min-Fe Fum-FA-DHA (PRENATAL 1 PO)  Take by mouth                  Discharge Date: 2020    Condition on discharge: Stable    Plan:   Follow up in 6 week(s)  Reviewed discharge instructions and scripts     Melba Montano MD

## 2020-07-17 NOTE — LACTATION NOTE
This note was copied from a baby's chart. Lactation Progress Note      Data:     Pt requests assessment of latch. Pt c/o sore nipples, states with this latch initial latch on was tender but eased soon after latching and now remains comfortable without pain. History of difficulty and pain with latching with first child d/t tongue tie. Action: Discussed reassuring signs of ANTONIO, including chin indenting the breast, wide open mouth, lips flanged, cheeks rounded, and cheeks, nose, and chin touching the breast. Educated on how a good latch should look and feel. Explained that latch should not cause pain or discomfort, and that nipple should appear rounded when baby detaches from the breast without pinching, creasing, blanching or redness. Explained that tongue tie may cause pain with latching despite deep latching and encouraged to have infant assessed for tongue tie if continues to struggle with painful latch considering history. Breast feeding education reinforced including breast care, expected  feeding behaviors during the first 24-48 hours of life, and how to know baby is getting enough at the breast. Name and number provided on whiteboard. Encouraged to call for f/u support prn. Response: Verbalized understanding of teaching provided. Confirms latch is comfortable with this feed. Will call for f/u support prn.

## 2020-07-17 NOTE — RESULT ENCOUNTER NOTE
Your test for COVID-19, also known as novel coronavirus, came back negative. No virus was detected from the sample collected. Testing is not 100%. Until your symptoms are fully resolved, you may still be contagious. We recommend that you remain isolated for 7 days minimum or 72 hours after your symptoms have completely resolved, whichever is longer. If you were exposed to a known positive COVID-19 patient, then you must remain isolated for 14 days. If you were tested for a pre-op, then you remain in isolated until your procedure. Continually monitor symptoms. Contact a medical provider if symptoms are worsening. If you have any additional questions, contact your PCP.     For additional information, please visit the Centers for Disease Control and Prevention [email or web address]

## 2020-07-17 NOTE — PLAN OF CARE
Problem: Discharge Planning:  Goal: Discharged to appropriate level of care  Description: Discharged to appropriate level of care  Outcome: Completed     Problem: Constipation:  Goal: Bowel elimination is within specified parameters  Description: Bowel elimination is within specified parameters  7/17/2020 1001 by Vkiki Lagos RN  Outcome: Completed  7/17/2020 0307 by Kathy Hill RN  Outcome: Ongoing     Problem: Fluid Volume - Imbalance:  Goal: Absence of imbalanced fluid volume signs and symptoms  Description: Absence of imbalanced fluid volume signs and symptoms  7/17/2020 1001 by Vikki Lagos RN  Outcome: Completed  7/17/2020 0307 by Kathy Hill RN  Outcome: Ongoing  Goal: Absence of postpartum hemorrhage signs and symptoms  Description: Absence of postpartum hemorrhage signs and symptoms  Outcome: Completed     Problem: Infection - Risk of, Puerperal Infection:  Goal: Will show no infection signs and symptoms  Description: Will show no infection signs and symptoms  7/17/2020 1001 by Vikki Lagos RN  Outcome: Completed  7/17/2020 0307 by Kathy Hill RN  Outcome: Ongoing     Problem: Mood - Altered:  Goal: Mood stable  Description: Mood stable  7/17/2020 1001 by Vikki Lagos RN  Outcome: Completed  7/17/2020 0307 by Kathy Hill RN  Outcome: Met This Shift     Problem: Pain - Acute:  Goal: Pain level will decrease  Description: Pain level will decrease  7/17/2020 1001 by Vikki Lagos RN  Outcome: Completed  7/17/2020 0307 by Kathy Hill RN  Outcome: Ongoing

## 2020-07-17 NOTE — PLAN OF CARE
Problem: Mood - Altered:  Goal: Mood stable  Description: Mood stable  7/17/2020 0307 by Monty Rodriguez RN  Outcome: Met This Shift  7/16/2020 1335 by Yamilet Osborne RN  Outcome: Ongoing     Problem: Discharge Planning:  Goal: Discharged to appropriate level of care  Description: Discharged to appropriate level of care  7/16/2020 1335 by Yamilet Osborne RN  Outcome: Ongoing     Problem: Constipation:  Goal: Bowel elimination is within specified parameters  Description: Bowel elimination is within specified parameters  7/17/2020 0307 by Monty Rodriguez RN  Outcome: Ongoing  7/16/2020 1335 by Yamilet Osborne RN  Outcome: Ongoing     Problem: Fluid Volume - Imbalance:  Goal: Absence of imbalanced fluid volume signs and symptoms  Description: Absence of imbalanced fluid volume signs and symptoms  7/17/2020 0307 by Monty Rodriguez RN  Outcome: Ongoing  7/16/2020 1335 by Yamilet Osborne RN  Outcome: Ongoing  Goal: Absence of postpartum hemorrhage signs and symptoms  Description: Absence of postpartum hemorrhage signs and symptoms  7/16/2020 1335 by Yamilet Osborne RN  Outcome: Ongoing     Problem: Infection - Risk of, Puerperal Infection:  Goal: Will show no infection signs and symptoms  Description: Will show no infection signs and symptoms  7/17/2020 0307 by Monty Rodriguez RN  Outcome: Ongoing  7/16/2020 1335 by Yamilet Osborne RN  Outcome: Ongoing     Problem: Pain - Acute:  Goal: Pain level will decrease  Description: Pain level will decrease  7/17/2020 0307 by Monty Rodriguez RN  Outcome: Ongoing  7/16/2020 1335 by Yamilet Osborne RN  Outcome: Ongoing

## 2021-03-10 ENCOUNTER — OFFICE VISIT (OUTPATIENT)
Dept: ORTHOPEDIC SURGERY | Age: 36
End: 2021-03-10
Payer: COMMERCIAL

## 2021-03-10 VITALS — BODY MASS INDEX: 22.76 KG/M2 | HEIGHT: 67 IN | WEIGHT: 145 LBS

## 2021-03-10 DIAGNOSIS — R52 PAIN: Primary | ICD-10-CM

## 2021-03-10 PROCEDURE — 99202 OFFICE O/P NEW SF 15 MIN: CPT | Performed by: PHYSICIAN ASSISTANT

## 2021-03-11 NOTE — PROGRESS NOTES
Chief Complaint    Back Pain (FELL DOWN THE STEPS A FEW WEEKS AGO AND HAD BRUISING, SWELLING. WAS DOING OK AND THEN SHOVELED SNOW. SINCE THEN HAS HAD INCREASED PAIN ACROSS TAILBONE INTO BILATERAL GLUTES AND AT TIMES DOWN HER POSTERIOR THIGH. RADIATING PAIN IS INTERMITTENT)      History of Present Illness:  Carlos Lester is a 37 y.o. female female who presents tonight for evaluation of tailbone injury. Patient states that approximately 2 to 3 weeks ago she was ambulating in her home and slipped down the stairs in her home impacting her tailbone. She states that she impacted several steps on her way down the staircase. She had immediate pain over her coccyx but she states that this was improving but was made worse by shoveling snow. She states that she has moderate pain with sitting but finds that arising from a seated position and with slight flexion she has increased pain with in both buttocks. She also has significant pain within her coccyx. She denies any pain with straining to have a bowel movement. She has had no bowel or bladder dysfunction. She denies any radiation of the pain into either lower extremity. Denies any paresthesias into her lower extremities. She does find it sitting on firm surfaces to increase her pain. Pain Assessment  Location of Pain: Coccyx  Location Modifiers: Posterior  Severity of Pain: 3  Quality of Pain: Throbbing, Sharp(SHOOTING)  Duration of Pain: Persistent  Frequency of Pain: Constant  Aggravating Factors: Standing, Walking, Other (Comment)(SITTING)  Limiting Behavior: Yes  Relieving Factors: Rest  Result of Injury: Yes  Work-Related Injury: No  Are there other pain locations you wish to document?: No    Medical History:  Patient's medications, allergies, past medical, surgical, social and family histories were reviewed and updated as appropriate.     Review of Systems:  Pertinent items are noted in HPI  Review of systems reviewed from Patient History Form dated on 3/10/2021 and available in the patient's chart under the Media tab. Vital Signs:  Ht 5' 7\" (1.702 m)   Wt 145 lb (65.8 kg)   BMI 22.71 kg/m²     General Exam:   Constitutional: Patient is adequately groomed with no evidence of malnutrition    Lumbar/Sacral Spine Examination:    Inspection: Today's inspection of the lumbar spine/sacrum/coccyx reveals no obvious deformity and no present bruising. Palpation: Patient is diffusely tender to palpation over the terminal end of the coccyx. She has no palpable lumbar spasm noted and she has very little low back pain. Range of Motion: Decreased range of motion is noted especially with flexion secondary to pain. There is minimal pain noted with extension and with torsional movement of the lumbar spine. Strength: There are no strength deficits noted upon testing of either lower extremity    Special Tests: There are no sensory deficits noted upon testing of either lower extremity    Skin: There are no rashes, ulcerations or lesions. Gait: Without a limp at the present time    Reflex DTRs are noted to be 2/2 and equal bilaterally    Radiology:     X-rays obtained and reviewed in office:  Views 3 views to include AP of the pelvis, lateral of the sacrum and coccyx, and spot lateral were obtained today in the office  Location sacrum and coccyx  Impression x-rays reveal the terminal end of the coccyx to be angulated representing acute fracture secondary to recent trauma        Assessment : Coccygeal fracture    Impression:  Encounter Diagnosis   Name Primary?  Pain Yes       Office Procedures:  Orders Placed This Encounter   Procedures    XR SACRUM COCCYX (MIN 2 VIEWS)     Standing Status:   Future     Number of Occurrences:   1     Standing Expiration Date:   4/10/2021       Treatment Plan: Tonight we discussed the diagnosis and treatment options.   The patient is to continue with her conservative treatments avoidance of sitting on hard surfaces, contrast treatment of warm bath to ice, and she was given some gentle stretching and progressive strengthening for her lumbar spine. She is to continue with over-the-counter medication as needed and she will follow-up with Dr. Nicolle Devine as needed.     Total evaluation time 20 minutes    Patient examined and note dictated by Tierra Villavicencio PA-C.

## (undated) DEVICE — GLOVE SURG SZ 65 THK91MIL LTX FREE SYN POLYISOPRENE

## (undated) DEVICE — TELFA NON-ADHERENT ABSORBENT DRESSING: Brand: TELFA

## (undated) DEVICE — SET COLL TBNG L6FT DIA3/8IN W/ INTEGR SWVL HNDL SLIP RNG M

## (undated) DEVICE — GOWN,SIRUS,NON REINFRCD,LARGE,SET IN SL: Brand: MEDLINE

## (undated) DEVICE — SOLUTION IV 1000ML LAC RINGERS PH 6.5 INJ USP VIAFLX PLAS

## (undated) DEVICE — SYSTEM COLL W/ TISS TRAP INCLUDE COLL CANSTR LID SET OF

## (undated) DEVICE — CATHETER ETER URIN INTMIT NELATON RED RUB 16 FR

## (undated) DEVICE — X-RAY DETECTABLE SPONGES,16 PLY: Brand: VISTEC

## (undated) DEVICE — Z DISCONTINUED USE 2659133 CANNULA VAC DIA8MM CRV SEMI RIG W/ ROUNDED TIP TAPR END

## (undated) DEVICE — GLOVE SURG SZ 65 L12IN FNGR THK94MIL STD WHT LTX FREE

## (undated) DEVICE — TRAY PREP DRY W/ PREM GLV 2 APPL 6 SPNG 2 UNDPD 1 OVERWRAP

## (undated) DEVICE — TOWEL,OR,DSP,ST,BLUE,STD,4/PK,20PK/CS: Brand: MEDLINE

## (undated) DEVICE — SET ADMIN PRIMING 7ML L30IN 7.35LB 20 GTT 2ND RLER CLMP

## (undated) DEVICE — UNDERPAD HOSP W30XL36IN GRN POLYPR HI ABSRB DISP

## (undated) DEVICE — STRAP RESTRN W3.5XL18IN STD ALL PURP DISP W/ SLNG RNG

## (undated) DEVICE — PACK LITH I INCL DRP W ATTCH LEG BK TBL CVR ABSRB TWL CUST

## (undated) DEVICE — CATHETER IV 20GA L1.25IN PNK FEP SFTY STR HUB RADPQ DISP

## (undated) DEVICE — SET GRAV VENT NVENT CK VLV 3 NDL FREE PRT 10 GTT

## (undated) DEVICE — 3M™ TEGADERM™ TRANSPARENT FILM DRESSING FRAME STYLE, 1624W, 2-3/8 IN X 2-3/4 IN (6 CM X 7 CM), 100/CT 4CT/CASE: Brand: 3M™ TEGADERM™